# Patient Record
Sex: MALE
[De-identification: names, ages, dates, MRNs, and addresses within clinical notes are randomized per-mention and may not be internally consistent; named-entity substitution may affect disease eponyms.]

---

## 2020-10-23 ENCOUNTER — APPOINTMENT (OUTPATIENT)
Dept: OTOLARYNGOLOGY | Facility: CLINIC | Age: 60
End: 2020-10-23
Payer: MEDICARE

## 2020-10-23 DIAGNOSIS — H90.A22 SENSORINEURAL HEARING LOSS, UNILATERAL, LEFT EAR, WITH RESTRICTED HEARING ON THE CONTRALATERAL SIDE: ICD-10-CM

## 2020-10-23 DIAGNOSIS — H90.5 UNSPECIFIED SENSORINEURAL HEARING LOSS: ICD-10-CM

## 2020-10-23 DIAGNOSIS — J31.0 CHRONIC RHINITIS: ICD-10-CM

## 2020-10-23 PROBLEM — Z00.00 ENCOUNTER FOR PREVENTIVE HEALTH EXAMINATION: Status: ACTIVE | Noted: 2020-10-23

## 2020-10-23 PROCEDURE — 92557 COMPREHENSIVE HEARING TEST: CPT

## 2020-10-23 PROCEDURE — 92567 TYMPANOMETRY: CPT

## 2020-10-23 PROCEDURE — 99203 OFFICE O/P NEW LOW 30 MIN: CPT | Mod: 25

## 2020-10-23 PROCEDURE — 31231 NASAL ENDOSCOPY DX: CPT

## 2020-10-23 RX ORDER — LISINOPRIL 30 MG/1
TABLET ORAL
Refills: 0 | Status: ACTIVE | COMMUNITY

## 2020-10-23 RX ORDER — PREDNISONE 10 MG
TABLET ORAL
Refills: 0 | Status: ACTIVE | COMMUNITY

## 2020-10-23 RX ORDER — GLIMEPIRIDE 4 MG/1
TABLET ORAL
Refills: 0 | Status: ACTIVE | COMMUNITY

## 2020-10-23 RX ORDER — METFORMIN HYDROCHLORIDE 625 MG/1
TABLET ORAL
Refills: 0 | Status: ACTIVE | COMMUNITY

## 2020-10-23 RX ORDER — GABAPENTIN 100 MG/1
CAPSULE ORAL
Refills: 0 | Status: ACTIVE | COMMUNITY

## 2020-10-23 NOTE — HISTORY OF PRESENT ILLNESS
[de-identified] : Patient reports 2 month history of right-sided hearing loss. 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 He was on oxicillin at the time and does not remember taking any other ototoxic medications. He was discharged from the hospital last Monday. Feels as if his right ear is "blocked" denies tinnitus, vertigo, otalgia, or otorrhea. History of severe otitis on the right side many years ago and underwent a procedure that sounds like patching of the tympanic membrane perforation. Also has known septal perforation from internasal medication, with copious crusting

## 2020-10-23 NOTE — ASSESSMENT
[FreeTextEntry1] : Weight asymmetric hearing loss in the mid and upper frequencies. He needs retrocochlear workup criterion. This was complicated by the fact that he had surgery and this year previously. There are no previous audiogram seeking care Tuesday we will proceed with MRI to rule out retrocochlear lesions.  FU after\par \par Chronic septal perforation, recommended saline use

## 2020-11-11 ENCOUNTER — APPOINTMENT (OUTPATIENT)
Dept: MRI IMAGING | Facility: CLINIC | Age: 60
End: 2020-11-11
Payer: MEDICARE

## 2020-11-11 ENCOUNTER — OUTPATIENT (OUTPATIENT)
Dept: OUTPATIENT SERVICES | Facility: HOSPITAL | Age: 60
LOS: 1 days | End: 2020-11-11

## 2020-11-11 PROCEDURE — 70553 MRI BRAIN STEM W/O & W/DYE: CPT | Mod: 26

## 2020-11-16 ENCOUNTER — APPOINTMENT (OUTPATIENT)
Dept: OTOLARYNGOLOGY | Facility: CLINIC | Age: 60
End: 2020-11-16
Payer: MEDICARE

## 2020-11-16 VITALS — HEIGHT: 65 IN | TEMPERATURE: 96.7 F | WEIGHT: 165 LBS | BODY MASS INDEX: 27.49 KG/M2

## 2020-11-16 DIAGNOSIS — H90.A31 MIXED CONDUCTIVE AND SENSORINEURAL HEARING LOSS, UNILATERAL, RIGHT EAR WITH RESTRICTED HEARING ON THE  CONTRALATERAL SIDE: ICD-10-CM

## 2020-11-16 DIAGNOSIS — H65.21 CHRONIC SEROUS OTITIS MEDIA, RIGHT EAR: ICD-10-CM

## 2020-11-16 PROCEDURE — 69420 INCISION OF EARDRUM: CPT

## 2020-11-16 PROCEDURE — 99213 OFFICE O/P EST LOW 20 MIN: CPT | Mod: 25

## 2020-11-16 RX ORDER — TRAMADOL HYDROCHLORIDE 50 MG/1
50 TABLET, COATED ORAL
Qty: 30 | Refills: 0 | Status: ACTIVE | COMMUNITY
Start: 2020-08-13

## 2020-11-16 RX ORDER — NAPROXEN 500 MG/1
500 TABLET ORAL
Qty: 20 | Refills: 0 | Status: ACTIVE | COMMUNITY
Start: 2020-06-29

## 2020-11-16 RX ORDER — CELECOXIB 100 MG/1
100 CAPSULE ORAL
Qty: 60 | Refills: 0 | Status: ACTIVE | COMMUNITY
Start: 2020-08-03

## 2020-11-16 RX ORDER — INSULIN ASPART 100 [IU]/ML
100 INJECTION, SOLUTION INTRAVENOUS; SUBCUTANEOUS
Qty: 15 | Refills: 0 | Status: ACTIVE | COMMUNITY
Start: 2020-11-03

## 2020-11-16 RX ORDER — OMEPRAZOLE 40 MG/1
40 CAPSULE, DELAYED RELEASE ORAL
Qty: 30 | Refills: 0 | Status: ACTIVE | COMMUNITY
Start: 2020-11-03

## 2020-11-16 RX ORDER — ONDANSETRON 4 MG/1
4 TABLET ORAL
Qty: 20 | Refills: 0 | Status: ACTIVE | COMMUNITY
Start: 2020-08-13

## 2020-11-16 RX ORDER — BLOOD-GLUCOSE METER
W/DEVICE KIT MISCELLANEOUS
Qty: 1 | Refills: 0 | Status: ACTIVE | COMMUNITY
Start: 2020-07-17

## 2020-11-16 RX ORDER — INSULIN GLARGINE 100 [IU]/ML
100 INJECTION, SOLUTION SUBCUTANEOUS
Qty: 15 | Refills: 0 | Status: ACTIVE | COMMUNITY
Start: 2020-10-06

## 2020-11-16 RX ORDER — CELECOXIB 200 MG/1
200 CAPSULE ORAL
Qty: 30 | Refills: 0 | Status: ACTIVE | COMMUNITY
Start: 2020-10-06

## 2020-11-16 RX ORDER — ALBUTEROL SULFATE 90 UG/1
108 (90 BASE) INHALANT RESPIRATORY (INHALATION)
Qty: 8 | Refills: 0 | Status: ACTIVE | COMMUNITY
Start: 2020-06-25

## 2020-11-16 RX ORDER — PREDNISONE 20 MG/1
20 TABLET ORAL
Qty: 30 | Refills: 0 | Status: ACTIVE | COMMUNITY
Start: 2020-06-25

## 2020-11-16 RX ORDER — SEMAGLUTIDE 1.34 MG/ML
2 INJECTION, SOLUTION SUBCUTANEOUS
Qty: 2 | Refills: 0 | Status: ACTIVE | COMMUNITY
Start: 2020-06-10

## 2020-11-16 RX ORDER — OMEPRAZOLE, SODIUM BICARBONATE 20; 1680 MG/1; MG/1
20-1680 POWDER, FOR SUSPENSION ORAL
Qty: 90 | Refills: 0 | Status: ACTIVE | COMMUNITY
Start: 2020-06-15

## 2020-11-16 RX ORDER — PEN NEEDLE, DIABETIC, SAFETY 30 GX3/16"
30G X 5 MM NEEDLE, DISPOSABLE MISCELLANEOUS
Qty: 100 | Refills: 0 | Status: ACTIVE | COMMUNITY
Start: 2020-11-02

## 2020-11-16 RX ORDER — METFORMIN ER 500 MG 500 MG/1
500 TABLET ORAL
Qty: 60 | Refills: 0 | Status: ACTIVE | COMMUNITY
Start: 2020-07-02

## 2020-11-16 RX ORDER — OXYCODONE 5 MG/1
5 TABLET ORAL
Qty: 30 | Refills: 0 | Status: ACTIVE | COMMUNITY
Start: 2020-08-13

## 2020-11-16 NOTE — ASSESSMENT
[FreeTextEntry1] : Right serous effusion, mastoid fluid on MRI, s/p myringotomy.  He will FU in 2 weeks and let me know if his hearing is improved.  Recheck audio, consider PET \par \par Chronic septal perforation, recommended saline use

## 2020-11-16 NOTE — HISTORY OF PRESENT ILLNESS
[de-identified] : Patient seen Oct 23, reported 2 month history of right-sided hearing loss. 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 He was on oxicillin at the time and does not remember taking any other ototoxic medications. He was discharged from the hospital last Monday. Feels as if his right ear is "blocked" denies tinnitus, vertigo, otalgia, or otorrhea. History of severe otitis on the right side many years ago and underwent a procedure that sounds like patching of the tympanic membrane perforation. Also has known septal perforation from internasal medication, with copious crusting\par \par Audio showed mixed asymmetry, right TM retracted.  MRI showed no retrocochlear lesions but fluid in right mastoid.  Pt feels the right ear is still clogged with movement of fluid when he puts his head in a dependent position

## 2021-01-08 ENCOUNTER — APPOINTMENT (OUTPATIENT)
Dept: OTOLARYNGOLOGY | Facility: CLINIC | Age: 61
End: 2021-01-08
Payer: MEDICARE

## 2021-01-08 DIAGNOSIS — J34.89 OTHER SPECIFIED DISORDERS OF NOSE AND NASAL SINUSES: ICD-10-CM

## 2021-01-08 DIAGNOSIS — H69.80 OTHER SPECIFIED DISORDERS OF EUSTACHIAN TUBE, UNSPECIFIED EAR: ICD-10-CM

## 2021-01-08 DIAGNOSIS — H90.3 SENSORINEURAL HEARING LOSS, BILATERAL: ICD-10-CM

## 2021-01-08 PROCEDURE — 92567 TYMPANOMETRY: CPT

## 2021-01-08 PROCEDURE — 99213 OFFICE O/P EST LOW 20 MIN: CPT

## 2021-01-08 PROCEDURE — 99072 ADDL SUPL MATRL&STAF TM PHE: CPT

## 2021-01-08 PROCEDURE — 92557 COMPREHENSIVE HEARING TEST: CPT

## 2021-01-08 NOTE — HISTORY OF PRESENT ILLNESS
[de-identified] : Patient seen Oct 23, reported 2 month history of right-sided hearing loss. 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 He was on oxicillin at the time and does not remember taking any other ototoxic medications. He was discharged from the hospital last Monday. Feels as if his right ear is "blocked" denies tinnitus, vertigo, otalgia, or otorrhea. History of severe otitis on the right side many years ago and underwent a procedure that sounds like patching of the tympanic membrane perforation. Also has known septal perforation from internasal medication, with copious crusting\par \par Audio showed mixed asymmetry, right TM retracted.  MRI showed no retrocochlear lesions but fluid in right mastoid.  Pt feels the right ear is still clogged with movement of fluid when he puts his head in a dependent position.  Myringotomy gave him immediate relief 11/16/20.  Still feels hearing is normal and back at baseline.  No recurrence of symptoms

## 2021-01-08 NOTE — ASSESSMENT
[FreeTextEntry1] : Right serous effusion, mastoid fluid on MRI, s/p myringotomy, still doing well, audio WNL except type C left, no need for PET \par \par Chronic septal perforation, recommended saline use